# Patient Record
Sex: MALE | Race: OTHER | NOT HISPANIC OR LATINO | URBAN - METROPOLITAN AREA
[De-identification: names, ages, dates, MRNs, and addresses within clinical notes are randomized per-mention and may not be internally consistent; named-entity substitution may affect disease eponyms.]

---

## 2022-09-29 ENCOUNTER — EMERGENCY (EMERGENCY)
Facility: HOSPITAL | Age: 47
LOS: 1 days | Discharge: HOME | End: 2022-09-29
Attending: EMERGENCY MEDICINE | Admitting: EMERGENCY MEDICINE

## 2022-09-29 VITALS
HEIGHT: 69 IN | SYSTOLIC BLOOD PRESSURE: 165 MMHG | OXYGEN SATURATION: 96 % | RESPIRATION RATE: 19 BRPM | HEART RATE: 70 BPM | TEMPERATURE: 98 F | DIASTOLIC BLOOD PRESSURE: 117 MMHG | WEIGHT: 199.08 LBS

## 2022-09-29 VITALS
SYSTOLIC BLOOD PRESSURE: 152 MMHG | DIASTOLIC BLOOD PRESSURE: 104 MMHG | RESPIRATION RATE: 16 BRPM | TEMPERATURE: 98 F | HEART RATE: 60 BPM | OXYGEN SATURATION: 98 %

## 2022-09-29 DIAGNOSIS — S09.90XA UNSPECIFIED INJURY OF HEAD, INITIAL ENCOUNTER: ICD-10-CM

## 2022-09-29 DIAGNOSIS — R03.0 ELEVATED BLOOD-PRESSURE READING, WITHOUT DIAGNOSIS OF HYPERTENSION: ICD-10-CM
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
## 2022-09-29 PROCEDURE — 70450 CT HEAD/BRAIN W/O DYE: CPT | Mod: 26

## 2022-09-29 PROCEDURE — 99284 EMERGENCY DEPT VISIT MOD MDM: CPT

## 2022-09-29 NOTE — ED PROVIDER NOTE - CARE PROVIDER_API CALL
Chico Huston (DO)  [address], [facility] [place] [address]  Phone: [phone]  Fax: [phone]  Follow Up Time: 4-6 Days

## 2022-09-29 NOTE — ED PROVIDER NOTE - ENMT, MLM
Airway patent; Mild tenderness to the L posterior head. Airway patent; Mild tenderness to the L posterior head. skin intact no bony abnormalities

## 2022-09-29 NOTE — ED ADULT TRIAGE NOTE - CHIEF COMPLAINT QUOTE
Pt walked in w/ head injury. Was at work yesterday when a metal sidney fell on his head. Denies LOC. No bleeding noted.   Denies dizziness and headache, but feels "tense" no nausea/vomiting. Pt not diagnosed w/ HTN but says "sometimes I get high blood pressure"  Ambulates w/ a steady gait.

## 2022-09-29 NOTE — ED PROVIDER NOTE - NEUROLOGICAL, MLM
Alert and oriented, no focal deficits, no motor or sensory deficits. Alert and oriented, speech fluent and appropriate cooperative  no motor or sensory deficits.

## 2022-09-29 NOTE — ED PROVIDER NOTE - CARE PLAN
Principal Discharge DX:	Closed head injury  Secondary Diagnosis:	Elevated blood pressure, situational   1

## 2022-09-29 NOTE — ED PROVIDER NOTE - CLINICAL SUMMARY MEDICAL DECISION MAKING FREE TEXT BOX
46 y/o M presenting with pain to the back of his head s/p head injury at work yesterday. Plan for CT head imaging to r/o intracranial injuries. Will reassess clinically after results have been obtained. 48 y/o M presenting with pain to the back of his head s/p head injury at work yesterday. Plan for CT head imaging to r/o intracranial injuries. Will reassess clinically after results have been obtained.    ED evaluation and management discussed with the patient and family (if available) in detail.  Close PMD follow up encouraged.  Strict ED return instructions discussed in detail and patient given the opportunity to ask any questions about their discharge diagnosis and instructions. Patient verbalized understanding.    no headache at time of dc   appears very well

## 2022-09-29 NOTE — ED ADULT NURSE NOTE - OBJECTIVE STATEMENT
Pt aox3 with steady gait. Pt was at work yesterday when a metal sidney fell on his head. Denies LOC. No bleeding noted.   Denies dizziness and headache, but feels "tense" no nausea/vomiting. Pt not diagnosed w/ HTN but says "sometimes I get high blood pressure"

## 2022-09-29 NOTE — ED PROVIDER NOTE - OBJECTIVE STATEMENT
Triage Chief complaint: head injury  Nurse triage notes: Pt walked in w/ head injury. Was at work yesterday when a metal sidney fell on his head. Denies LOC. No bleeding noted. Denies dizziness and headache, but feels "tense" no nausea/vomiting. Pt not diagnosed w/ HTN but says "sometimes I get high blood pressure." Ambulates w/ a steady gait.  Individuals Present: Dr. Oviedo (ED Attending), Ministerio Guerrier (Scribe), Pt  Vital Signs: HR: 70, BP: 165/117, Respiratory Rate in minutes: 19, Temp(F): 98.2, SpO2: 96%   _____________________________________________________   48 y/o M with no PMH [no daily medication use] presents ot the ED for evaluation s/p head injury. Pt reports he works at the [employer] Pt was leaning down and pushing a cart of ice when a metal sidney fell from above and struck the back of his head. He did not have LOC after the incident. Pt spoke with his sister who works at a hospital and Pt was encouraged to go to the ED for evaluation. He returned to work today but was "feeling funny" with pain to the back of his head. He decided to come to the ED for evaluation. Pt denies N/V, difficulty walking, difficulty talking, difficulty with balance, difficulty understanding when spoken to, visual changes, numbness, tingling, weakness, or any additional injuries.

## 2022-09-29 NOTE — ED PROVIDER NOTE - NSFOLLOWUPINSTRUCTIONS_ED_ALL_ED_FT
follow up with primary care physician next week for blood pressure measurement     stay well hydrated     return to ER if symptoms worsen or for any other concern

## 2022-09-29 NOTE — ED PROVIDER NOTE - PATIENT PORTAL LINK FT
You can access the FollowMyHealth Patient Portal offered by [facility] by registering at the following website: [email or web address]. By joining [employer]’s FollowMyHealth portal, you will also be able to view your health information using other applications (apps) compatible with our system.
